# Patient Record
Sex: MALE | Race: WHITE | NOT HISPANIC OR LATINO | URBAN - METROPOLITAN AREA
[De-identification: names, ages, dates, MRNs, and addresses within clinical notes are randomized per-mention and may not be internally consistent; named-entity substitution may affect disease eponyms.]

---

## 2024-11-07 ENCOUNTER — APPOINTMENT (OUTPATIENT)
Dept: URBAN - METROPOLITAN AREA CLINIC 211 | Age: 24
Setting detail: DERMATOLOGY
End: 2024-11-07

## 2024-11-07 DIAGNOSIS — L70.0 ACNE VULGARIS: ICD-10-CM

## 2024-11-07 PROCEDURE — OTHER PHOTO-DOCUMENTATION: OTHER

## 2024-11-07 PROCEDURE — 99204 OFFICE O/P NEW MOD 45 MIN: CPT

## 2024-11-07 PROCEDURE — OTHER COUNSELING: OTHER

## 2024-11-07 PROCEDURE — OTHER MIPS QUALITY: OTHER

## 2024-11-07 PROCEDURE — OTHER PRESCRIPTION MEDICATION MANAGEMENT: OTHER

## 2024-11-07 PROCEDURE — OTHER PRESCRIPTION: OTHER

## 2024-11-07 RX ORDER — DOXYCYCLINE HYCLATE 100 MG/1
CAPSULE, GELATIN COATED ORAL
Qty: 30 | Refills: 2 | Status: ERX | COMMUNITY
Start: 2024-11-07

## 2024-11-07 RX ORDER — CLINDAMYCIN PHOSPHATE/BENZOYL PEROXIDE/ADAPALENE 1.5; 31; 12 MG/G; MG/G; MG/G
GEL TOPICAL
Qty: 50 | Refills: 7 | Status: ERX | COMMUNITY
Start: 2024-11-07

## 2024-11-07 ASSESSMENT — LOCATION ZONE DERM: LOCATION ZONE: FACE

## 2024-11-07 ASSESSMENT — LOCATION DETAILED DESCRIPTION DERM: LOCATION DETAILED: LEFT FOREHEAD

## 2024-11-07 ASSESSMENT — SEVERITY ASSESSMENT OVERALL AMONG ALL PATIENTS
IN YOUR EXPERIENCE, AMONG ALL PATIENTS YOU HAVE SEEN WITH THIS CONDITION, HOW SEVERE IS THIS PATIENT'S CONDITION?: MULTIPLE INFLAMMATORY LESIONS BUT NONINFLAMMATORY LESIONS PREDOMINATE

## 2024-11-07 ASSESSMENT — LOCATION SIMPLE DESCRIPTION DERM: LOCATION SIMPLE: LEFT FOREHEAD

## 2024-11-07 NOTE — HPI: PIMPLES (ACNE)
What Type Of Note Output Would You Prefer (Optional)?: Bullet Format
How Severe Is Your Acne?: mild
Is This A New Presentation, Or A Follow-Up?: Acne
Additional Comments (Use Complete Sentences): Pt was on accutane for 4/5 months in 2021 and 2022.

## 2024-11-07 NOTE — PROCEDURE: PRESCRIPTION MEDICATION MANAGEMENT
Render In Strict Bullet Format?: Yes
Discontinue Regimen: Panoxyl
Detail Level: Zone
Plan: AM: \\n1. Cleanse: CLN Acne Wash\\n2. Moisturize ( Cerave Gel Cream or Cream  ) and apply SPF of choice daily (rec. Elta MD UV 46 Clear)\\n\\nPM: \\n1. Cleanse-  CLN Acne Wash\\n2. Start Cabtreo (retinoid).  Start with using a small pea size application point to each quadrant, thinly spread and moisturize once dry. Apply three nights week 1, increase to every other night application for two weeks, then nightly if tolerated.\\n3. Moisturize with Cerave Cream, be sure to allow this to dry completely before moisturizing\\n\\nNote: treatment can bleach clothing, towels, bedding
Samples Given: CLn acne wash

## 2024-11-07 NOTE — PROCEDURE: COUNSELING
High Dose Vitamin A Counseling: Side effects reviewed, pt to contact office should one occur.
Benzoyl Peroxide Pregnancy And Lactation Text: This medication is Pregnancy Category C. It is unknown if benzoyl peroxide is excreted in breast milk.
Topical Sulfur Applications Pregnancy And Lactation Text: This medication is Pregnancy Category C and has an unknown safety profile during pregnancy. It is unknown if this topical medication is excreted in breast milk.
Topical Retinoid counseling:  Patient advised to apply a pea-sized amount only at bedtime and wait 30 minutes after washing their face before applying.  If too drying, patient may add a non-comedogenic moisturizer. The patient verbalized understanding of the proper use and possible adverse effects of retinoids.  All of the patient's questions and concerns were addressed.
Azelaic Acid Pregnancy And Lactation Text: This medication is considered safe during pregnancy and breast feeding.
Birth Control Pills Counseling: Birth Control Pill Counseling: I discussed with the patient the potential side effects of OCPs including but not limited to increased risk of stroke, heart attack, thrombophlebitis, deep venous thrombosis, hepatic adenomas, breast changes, GI upset, headaches, and depression.  The patient verbalized understanding of the proper use and possible adverse effects of OCPs. All of the patient's questions and concerns were addressed.
Azithromycin Pregnancy And Lactation Text: This medication is considered safe during pregnancy and is also secreted in breast milk.
Tazorac Counseling:  Patient advised that medication is irritating and drying.  Patient may need to apply sparingly and wash off after an hour before eventually leaving it on overnight.  The patient verbalized understanding of the proper use and possible adverse effects of tazorac.  All of the patient's questions and concerns were addressed.
Dapsone Counseling: I discussed with the patient the risks of dapsone including but not limited to hemolytic anemia, agranulocytosis, rashes, methemoglobinemia, kidney failure, peripheral neuropathy, headaches, GI upset, and liver toxicity.  Patients who start dapsone require monitoring including baseline LFTs and weekly CBCs for the first month, then every month thereafter.  The patient verbalized understanding of the proper use and possible adverse effects of dapsone.  All of the patient's questions and concerns were addressed.
Topical Retinoid Pregnancy And Lactation Text: This medication is Pregnancy Category C. It is unknown if this medication is excreted in breast milk.
Use Enhanced Medication Counseling?: No
Bactrim Counseling:  I discussed with the patient the risks of sulfa antibiotics including but not limited to GI upset, allergic reaction, drug rash, diarrhea, dizziness, photosensitivity, and yeast infections.  Rarely, more serious reactions can occur including but not limited to aplastic anemia, agranulocytosis, methemoglobinemia, blood dyscrasias, liver or kidney failure, lung infiltrates or desquamative/blistering drug rashes.
Spironolactone Pregnancy And Lactation Text: This medication can cause feminization of the male fetus and should be avoided during pregnancy. The active metabolite is also found in breast milk.
Spironolactone Counseling: Patient advised regarding risks of diarrhea, abdominal pain, hyperkalemia, birth defects (for female patients), liver toxicity and renal toxicity. The patient may need blood work to monitor liver and kidney function and potassium levels while on therapy. The patient verbalized understanding of the proper use and possible adverse effects of spironolactone.  All of the patient's questions and concerns were addressed.
Azelaic Acid Counseling: Patient counseled that medicine may cause skin irritation and to avoid applying near the eyes.  In the event of skin irritation, the patient was advised to reduce the amount of the drug applied or use it less frequently.   The patient verbalized understanding of the proper use and possible adverse effects of azelaic acid.  All of the patient's questions and concerns were addressed.
Topical Sulfur Applications Counseling: Topical Sulfur Counseling: Patient counseled that this medication may cause skin irritation or allergic reactions.  In the event of skin irritation, the patient was advised to reduce the amount of the drug applied or use it less frequently.   The patient verbalized understanding of the proper use and possible adverse effects of topical sulfur application.  All of the patient's questions and concerns were addressed.
Aklief Pregnancy And Lactation Text: It is unknown if this medication is safe to use during pregnancy.  It is unknown if this medication is excreted in breast milk.  Breastfeeding women should use the topical cream on the smallest area of the skin for the shortest time needed while breastfeeding.  Do not apply to nipple and areola.
Topical Clindamycin Counseling: Patient counseled that this medication may cause skin irritation or allergic reactions.  In the event of skin irritation, the patient was advised to reduce the amount of the drug applied or use it less frequently.   The patient verbalized understanding of the proper use and possible adverse effects of clindamycin.  All of the patient's questions and concerns were addressed.
Tetracycline Counseling: Patient counseled regarding possible photosensitivity and increased risk for sunburn.  Patient instructed to avoid sunlight, if possible.  When exposed to sunlight, patients should wear protective clothing, sunglasses, and sunscreen.  The patient was instructed to call the office immediately if the following severe adverse effects occur:  hearing changes, easy bruising/bleeding, severe headache, or vision changes.  The patient verbalized understanding of the proper use and possible adverse effects of tetracycline.  All of the patient's questions and concerns were addressed. Patient understands to avoid pregnancy while on therapy due to potential birth defects.
Minocycline Counseling: Patient advised regarding possible photosensitivity and discoloration of the teeth, skin, lips, tongue and gums.  Patient instructed to avoid sunlight, if possible.  When exposed to sunlight, patients should wear protective clothing, sunglasses, and sunscreen.  The patient was instructed to call the office immediately if the following severe adverse effects occur:  hearing changes, easy bruising/bleeding, severe headache, or vision changes.  The patient verbalized understanding of the proper use and possible adverse effects of minocycline.  All of the patient's questions and concerns were addressed.
Doxycycline Pregnancy And Lactation Text: This medication is Pregnancy Category D and not consider safe during pregnancy. It is also excreted in breast milk but is considered safe for shorter treatment courses.
Sarecycline Pregnancy And Lactation Text: This medication is Pregnancy Category D and not consider safe during pregnancy. It is also excreted in breast milk.
High Dose Vitamin A Pregnancy And Lactation Text: High dose vitamin A therapy is contraindicated during pregnancy and breast feeding.
Detail Level: Detailed
Aklief counseling:  Patient advised to apply a pea-sized amount only at bedtime and wait 30 minutes after washing their face before applying.  If too drying, patient may add a non-comedogenic moisturizer.  The most commonly reported side effects including irritation, redness, scaling, dryness, stinging, burning, itching, and increased risk of sunburn.  The patient verbalized understanding of the proper use and possible adverse effects of retinoids.  All of the patient's questions and concerns were addressed.
Sarecycline Counseling: Patient advised regarding possible photosensitivity and discoloration of the teeth, skin, lips, tongue and gums.  Patient instructed to avoid sunlight, if possible.  When exposed to sunlight, patients should wear protective clothing, sunglasses, and sunscreen.  The patient was instructed to call the office immediately if the following severe adverse effects occur:  hearing changes, easy bruising/bleeding, severe headache, or vision changes.  The patient verbalized understanding of the proper use and possible adverse effects of sarecycline.  All of the patient's questions and concerns were addressed.
Isotretinoin Pregnancy And Lactation Text: This medication is Pregnancy Category X and is considered extremely dangerous during pregnancy. It is unknown if it is excreted in breast milk.
Birth Control Pills Pregnancy And Lactation Text: This medication should be avoided if pregnant and for the first 30 days post-partum.
Tazorac Pregnancy And Lactation Text: This medication is not safe during pregnancy. It is unknown if this medication is excreted in breast milk.
Dapsone Pregnancy And Lactation Text: This medication is Pregnancy Category C and is not considered safe during pregnancy or breast feeding.
Topical Clindamycin Pregnancy And Lactation Text: This medication is Pregnancy Category B and is considered safe during pregnancy. It is unknown if it is excreted in breast milk.
Azithromycin Counseling:  I discussed with the patient the risks of azithromycin including but not limited to GI upset, allergic reaction, drug rash, diarrhea, and yeast infections.
Isotretinoin Counseling: Patient should get monthly blood tests, not donate blood, not drive at night if vision affected, not share medication, and not undergo elective surgery for 6 months after tx completed. Side effects reviewed, pt to contact office should one occur.
Winlevi Counseling:  I discussed with the patient the risks of topical clascoterone including but not limited to erythema, scaling, itching, and stinging. Patient voiced their understanding.
Benzoyl Peroxide Counseling: Patient counseled that medicine may cause skin irritation and bleach clothing.  In the event of skin irritation, the patient was advised to reduce the amount of the drug applied or use it less frequently.   The patient verbalized understanding of the proper use and possible adverse effects of benzoyl peroxide.  All of the patient's questions and concerns were addressed.
Bactrim Pregnancy And Lactation Text: This medication is Pregnancy Category D and is known to cause fetal risk.  It is also excreted in breast milk.
Erythromycin Counseling:  I discussed with the patient the risks of erythromycin including but not limited to GI upset, allergic reaction, drug rash, diarrhea, increase in liver enzymes, and yeast infections.
Erythromycin Pregnancy And Lactation Text: This medication is Pregnancy Category B and is considered safe during pregnancy. It is also excreted in breast milk.
Winlevi Pregnancy And Lactation Text: This medication is considered safe during pregnancy and breastfeeding.
Doxycycline Counseling:  Patient counseled regarding possible photosensitivity and increased risk for sunburn.  Patient instructed to avoid sunlight, if possible.  When exposed to sunlight, patients should wear protective clothing, sunglasses, and sunscreen.  The patient was instructed to call the office immediately if the following severe adverse effects occur:  hearing changes, easy bruising/bleeding, severe headache, or vision changes.  The patient verbalized understanding of the proper use and possible adverse effects of doxycycline.  All of the patient's questions and concerns were addressed.

## 2024-12-20 ENCOUNTER — APPOINTMENT (OUTPATIENT)
Dept: URBAN - METROPOLITAN AREA CLINIC 211 | Age: 24
Setting detail: DERMATOLOGY
End: 2024-12-30

## 2024-12-20 DIAGNOSIS — L70.0 ACNE VULGARIS: ICD-10-CM

## 2024-12-20 PROCEDURE — OTHER PRESCRIPTION: OTHER

## 2024-12-20 PROCEDURE — OTHER PHOTO-DOCUMENTATION: OTHER

## 2024-12-20 PROCEDURE — 99214 OFFICE O/P EST MOD 30 MIN: CPT

## 2024-12-20 PROCEDURE — OTHER MIPS QUALITY: OTHER

## 2024-12-20 PROCEDURE — OTHER PRESCRIPTION MEDICATION MANAGEMENT: OTHER

## 2024-12-20 PROCEDURE — OTHER COUNSELING: OTHER

## 2024-12-20 RX ORDER — CLINDAMYCIN PHOSPHATE AND BENZOYL PEROXIDE 10; 50 MG/G; MG/G
GEL TOPICAL
Qty: 45 | Refills: 5 | Status: ERX | COMMUNITY
Start: 2024-12-20

## 2024-12-20 RX ORDER — TAZAROTENE 0.45 MG/G
LOTION TOPICAL
Qty: 45 | Refills: 5 | Status: ERX | COMMUNITY
Start: 2024-12-20

## 2024-12-20 ASSESSMENT — LOCATION SIMPLE DESCRIPTION DERM: LOCATION SIMPLE: LEFT FOREHEAD

## 2024-12-20 ASSESSMENT — LOCATION DETAILED DESCRIPTION DERM: LOCATION DETAILED: LEFT FOREHEAD

## 2024-12-20 ASSESSMENT — LOCATION ZONE DERM: LOCATION ZONE: FACE

## 2024-12-20 ASSESSMENT — SEVERITY ASSESSMENT OVERALL AMONG ALL PATIENTS
IN YOUR EXPERIENCE, AMONG ALL PATIENTS YOU HAVE SEEN WITH THIS CONDITION, HOW SEVERE IS THIS PATIENT'S CONDITION?: INFLAMMATORY LESIONS MORE APPARENT; MANY COMEDONES AND PAPULES/PUSTULES, +/- FEW NODULOCYSTIC LESIONS

## 2024-12-20 NOTE — PROCEDURE: PRESCRIPTION MEDICATION MANAGEMENT
Render In Strict Bullet Format?: Yes
Discontinue Regimen: Panoxyl, CLn acne wash
Detail Level: Zone
Plan: AM: \\n1. Cleanse: CeraVe foaming cleanser \\n2. Start Neuac 2 weeks after starting Arazlo. Start with using a small pea size application point to each quadrant, thinly spread and moisturize once dry. \\n3. Moisturize ( Cerave Gel Cream or Cream  ) and apply SPF of choice daily (rec. Elta MD UV 46 Clear)\\n\\nPM: \\n1. Cleanse-  CeraVe foaming cleanser \\n2. Start Arazlo (retinoid).  Start with using a small pea size application point to each quadrant, thinly spread and moisturize once dry. Apply three nights week 1, increase to every other night application for two weeks, then nightly if tolerated.\\n3. Moisturize with Cerave Cream, be sure to allow this to dry completely before moisturizing\\n4. Continue doxycycline. Take 1 po qd. \\n\\nDiscussed r/b of increasing topical regimen vs accutane.  Noted increase in cystic lesions while on po abx course from last visit. Patient denies current anxiety and depression. Asked patient to log into [employer] account to transfer care to [facility] in anticipation of starting accutane.
Samples Given: Arazlo

## 2025-01-16 ENCOUNTER — APPOINTMENT (OUTPATIENT)
Dept: URBAN - METROPOLITAN AREA CLINIC 211 | Age: 25
Setting detail: DERMATOLOGY
End: 2025-01-17

## 2025-01-16 DIAGNOSIS — L70.0 ACNE VULGARIS: ICD-10-CM

## 2025-01-16 PROCEDURE — OTHER MIPS QUALITY: OTHER

## 2025-01-16 PROCEDURE — 99214 OFFICE O/P EST MOD 30 MIN: CPT

## 2025-01-16 PROCEDURE — OTHER COUNSELING: OTHER

## 2025-01-16 PROCEDURE — OTHER PRESCRIPTION MEDICATION MANAGEMENT: OTHER

## 2025-01-16 PROCEDURE — OTHER PHOTO-DOCUMENTATION: OTHER

## 2025-01-16 ASSESSMENT — LOCATION SIMPLE DESCRIPTION DERM: LOCATION SIMPLE: LEFT FOREHEAD

## 2025-01-16 ASSESSMENT — LOCATION ZONE DERM: LOCATION ZONE: FACE

## 2025-01-16 ASSESSMENT — LOCATION DETAILED DESCRIPTION DERM: LOCATION DETAILED: LEFT FOREHEAD

## 2025-01-16 NOTE — PROCEDURE: PRESCRIPTION MEDICATION MANAGEMENT
Render In Strict Bullet Format?: Yes
Detail Level: Zone
Plan: AM: \\n1. Cleanse: CeraVe foaming cleanser \\n2. Continue Neuac. Start with using a small pea size application point to each quadrant, thinly spread and moisturize once dry. \\n3. Moisturize ( Cerave Gel Cream or Cream) and apply SPF of choice daily (rec. Elta MD UV 46 Clear)\\n\\nPM: \\n1. Cleanse-  CeraVe foaming cleanser \\n2. Continue Arazlo (retinoid).  Start with using a small pea size application point to each quadrant, thinly spread and moisturize once dry. Apply three nights week 1, increase to every other night application for two weeks, then nightly if tolerated.\\n3. Moisturize with Cerave Cream, be sure to allow this to dry completely before moisturizing. Recommended switching to cerave cream or LRP cicaplast balm. \\n4. Finish doxycycline course. Take 1 po qd with food and water.\\n\\nAcne is actually fairly well improved, change barrier, notable xerosis perioral area (See above recs)\\nCannot take accutane as a  
Samples Given: Cerave cream, LRP cicaplast balm

## 2025-03-18 ENCOUNTER — APPOINTMENT (OUTPATIENT)
Dept: URBAN - METROPOLITAN AREA CLINIC 211 | Age: 25
Setting detail: DERMATOLOGY
End: 2025-03-19

## 2025-03-18 DIAGNOSIS — L70.0 ACNE VULGARIS: ICD-10-CM

## 2025-03-18 PROCEDURE — OTHER PHOTO-DOCUMENTATION: OTHER

## 2025-03-18 PROCEDURE — OTHER COUNSELING: OTHER

## 2025-03-18 PROCEDURE — OTHER MIPS QUALITY: OTHER

## 2025-03-18 PROCEDURE — OTHER PRESCRIPTION: OTHER

## 2025-03-18 PROCEDURE — 99214 OFFICE O/P EST MOD 30 MIN: CPT

## 2025-03-18 PROCEDURE — OTHER PRESCRIPTION MEDICATION MANAGEMENT: OTHER

## 2025-03-18 RX ORDER — TRIFAROTENE 50 UG/G
CREAM TOPICAL
Qty: 45 | Refills: 6 | Status: ERX | COMMUNITY
Start: 2025-03-18

## 2025-03-18 ASSESSMENT — LOCATION DETAILED DESCRIPTION DERM: LOCATION DETAILED: LEFT FOREHEAD

## 2025-03-18 ASSESSMENT — LOCATION ZONE DERM: LOCATION ZONE: FACE

## 2025-03-18 ASSESSMENT — LOCATION SIMPLE DESCRIPTION DERM: LOCATION SIMPLE: LEFT FOREHEAD

## 2025-03-18 NOTE — PROCEDURE: PRESCRIPTION MEDICATION MANAGEMENT
Render In Strict Bullet Format?: Yes
Initiate Treatment: Aklief
Discontinue Regimen: Arazlo
Detail Level: Zone
Plan: AM: \\n1. Cleanse: CeraVe foaming cleanser \\n2. Continue Neuac. Start with using a small pea size application point to each quadrant, thinly spread and moisturize once dry. \\n3. Moisturize ( Cerave Gel Cream or Cream) and apply SPF of choice daily (rec. Elta MD UV 46 Clear)\\n\\nPM: \\n1. Cleanse-  CeraVe foaming cleanser \\n2. Start Aklief (retinoid).  Start with using a small pea size application point to each quadrant, thinly spread and moisturize once dry. Apply three nights week 1, increase to every other night application for two weeks, then nightly if tolerated.\\n3. Moisturize with Cerave Cream, be sure to allow this to dry completely before moisturizing. Recommended switching to cerave cream or LRP cicaplast balm. \\n\\nIf Neuac and Aklief cannot be tolerated together, continue just the Aklief nightly.